# Patient Record
(demographics unavailable — no encounter records)

---

## 2025-01-15 NOTE — HISTORY OF PRESENT ILLNESS
[de-identified] : 01/15/2025  JACE OSEGUERA 80 year y/o M presents today for right knee pain. Patient reports a constant aching pain in the right knee and sharp pain with bending. Patient reports pain with stairs.    Date of Onset: About 1 month ago Mechanism of injury: NKI. Patient reports he believes he tripped about 1 month ago. Patient is unsure if his pain started due to the this fall.  Pain:    At Rest: 0/10    With Activity: 6/10   Have you been treated for this in the past? No OTC/Rx Medication: Ibuprofen 800mg PRN Heat, Ice, Elevation: No Previous Imaging/Studies: No

## 2025-01-15 NOTE — DISCUSSION/SUMMARY
[de-identified] :  JACE OSEGUERA 80 year M was seen and evaluated in office today. Following evaluation, the natural history of the pathology was explained in full to the patient in layman's terms.   Several different treatment options were discussed and explained in full to the patient, along with specific risks and benefits of both surgical and non-surgical treatments. Nonsurgical options including but not limited to Corticosteroid Injection, Visco supplementation, Prescription anti-inflammatory medications (both steroidal and non-steroidal), activity modification, non-impact exercise, maintaining a healthy BMI, bracing, and icing were all reviewed. Surgical options including but not limited to arthroscopy, and joint replacement along with other indicated procedures were discussed.   Discussed risk of morbidity associated with proposed treatment plans. These risks include, but are not limited to, the risk associated with prescription strength medications and possible side effects of these medications which include GI hemorrhage with oral medications along with cardiac/renal issues with long term use. Risks with all pertinent surgical interventions as discussed with patient including infection, bleeding, anesthesia complications, NV injury, fracture, DVT, or heterotopic ossification.   Furthermore, discussed with JACE that they could also delay any immediate treatment options and continue to observe and self-care for the discussed problem. Discussed Home Exercise Programs as well as Rest, Ice and elevation. Patient had ample time to ask any questions about todays visit and the diagnosis, and all questions were answered. Patient understands the plan going forward.   Lastly, based on this patient's presentation at our office, which is an orthopedic specialist office, this patient inherently / intrinsically has a risk of progression of symptoms/condition, as well as development and/or exacerbation of cardiovascular disease/conditions, obesity, DVT, worsening and chronic pain, and permanent loss of function, as well as decreased abilities to complete activities of daily life.  PLAN:    Following discussion of options including MRI, CSI, PT and Meloxicam, patient would like to proceed with RT KNEE CSI due to pain and inflammation.  Patient was given a CSI to the RIGHT KNEE via the superomedial portal advised to ice if sore and will observe over the next 24 hours for any redness, swelling or increased discomfort. The indication for this procedure was pain and inflammation. The site was prepped with betadine and sterile technique used. An injection of Lidocaine 5cc of 1% was used Betamethasone (Celestone) 1cc of 6mg.  The patient tolerated procedure well. They were advised to call if redness, pain or fever occur and apply ice for 15 minutes out of every hour for the next 12-24 hours as tolerated. The risks benefits, and alternatives have been discussed. These risks include infection, hemarthrosis, allergic reaction, bleeding and hyperglycemia. Verbal understanding and consent was obtained. There is a moderate risk of morbidity with further treatment, especially from use of prescription strength medication.   The patient f/u in 1 month.   Entered by Allen Gonzalez acting as scribe. Dr. Sunshine Attestation The documentation recorded by the scribe, in my presence, accurately reflects the service I, Dr. Sunshine, personally performed, and the decisions made by me with my edits as appropriate.

## 2025-01-15 NOTE — PHYSICAL EXAM
[de-identified] : Constitutional: The patient appears well developed, well nourished. Examination of patients ability to communicate functionally was normal.       Neurologic: Coordination is normal. Alert and oriented to time, place and person. No evidence of mood disorder, calm affect.            KNEE  : Inspection of the knee is as follows: moderate effusion. no ecchymosis, no streaking, no erythema, no atrophy, no deformities of the quad tendon and no deformities of patellar tendon.       Palpation of the knee is as follows: medial joint line tenderness, lateral joint line tenderness, medial facet of patella tenderness, lateral facet of patella tenderness and crepitus. no palpable masses and no increased warmth.       Knee Range of Motion is as follows in degrees:        Extension: 0    Flexion: 125        Strength examination of the knee is as follows:    Quadriceps strength is 5/5    Hamstring strength is 5/5       Ligament Stability and Special Test ligamentously stable, negative anterior draw, negative Lachman test, negative posterior draw and no varus or valgus instability.    negative McMurrays test and able to do active straight leg raise without an extensor lag.       Neurological examination of the knee is as follows: light touch is intact throughout.       Gait and function is as follows: mildly antalgic gait. 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 Medial/lateral joint space narrowing with minimal osteophyte formation.

## 2025-02-12 NOTE — HISTORY OF PRESENT ILLNESS
[de-identified] : 02/12/2025  JACE OSEGUERA 80 year y/o M presents today for follow up for right knee pain Treatments since last visit: CSI R knee on 01/15/25 with relief for a few days, Ibuprofen 800mg PRN with relief Changes Since Last Visit: Patient reports his right knee pain is about the same since his last visit.   01/15/2025  JACE OSEGUERA 80 year y/o M presents today for right knee pain. Patient reports a constant aching pain in the right knee and sharp pain with bending. Patient reports pain with stairs.    Date of Onset: About 1 month ago Mechanism of injury: NKI. Patient reports he believes he tripped about 1 month ago. Patient is unsure if his pain started due to the this fall.  Pain:    At Rest: 0/10    With Activity: 6/10   Have you been treated for this in the past? No OTC/Rx Medication: Ibuprofen 800mg PRN Heat, Ice, Elevation: No Previous Imaging/Studies: No

## 2025-02-12 NOTE — DISCUSSION/SUMMARY
[de-identified] :  JACE OSEGUERA 80 year M was seen and evaluated in office today. Following evaluation, the natural history of the pathology was explained in full to the patient in layman's terms.   Several different treatment options were discussed and explained in full to the patient, along with specific risks and benefits of both surgical and non-surgical treatments. Nonsurgical options including but not limited to Corticosteroid Injection, Visco supplementation, Prescription anti-inflammatory medications (both steroidal and non-steroidal), activity modification, non-impact exercise, maintaining a healthy BMI, bracing, and icing were all reviewed. Surgical options including but not limited to arthroscopy, and joint replacement along with other indicated procedures were discussed.   Discussed risk of morbidity associated with proposed treatment plans. These risks include, but are not limited to, the risk associated with prescription strength medications and possible side effects of these medications which include GI hemorrhage with oral medications along with cardiac/renal issues with long term use. Risks with all pertinent surgical interventions as discussed with patient including infection, bleeding, anesthesia complications, NV injury, fracture, DVT, or heterotopic ossification.   Furthermore, discussed with JACE that they could also delay any immediate treatment options and continue to observe and self-care for the discussed problem. Discussed Home Exercise Programs as well as Rest, Ice and elevation. Patient had ample time to ask any questions about todays visit and the diagnosis, and all questions were answered. Patient understands the plan going forward.   Lastly, based on this patient's presentation at our office, which is an orthopedic specialist office, this patient inherently / intrinsically has a risk of progression of symptoms/condition, as well as development and/or exacerbation of cardiovascular disease/conditions, obesity, DVT, worsening and chronic pain, and permanent loss of function, as well as decreased abilities to complete activities of daily life.  PLAN:   Patient was given Rx for MRI of the right knee to further evaluate for any ligamentous, muscle and cartilaginous injury that is suspected due to physical examination and patients description of pain, clicking, and feelings of instability and/or weakness.   Patient was instructed to f/u after imaging for review.

## 2025-02-12 NOTE — PHYSICAL EXAM
[de-identified] : Constitutional: The patient appears well developed, well nourished. Examination of patients ability to communicate functionally was normal.       Neurologic: Coordination is normal. Alert and oriented to time, place and person. No evidence of mood disorder, calm affect.            KNEE  : Inspection of the knee is as follows: moderate effusion. no ecchymosis, no streaking, no erythema, no atrophy, no deformities of the quad tendon and no deformities of patellar tendon.       Palpation of the knee is as follows: medial joint line tenderness, lateral joint line tenderness, medial facet of patella tenderness, lateral facet of patella tenderness and crepitus. no palpable masses and no increased warmth.       Knee Range of Motion is as follows in degrees:        Extension: 0    Flexion: 125        Strength examination of the knee is as follows:    Quadriceps strength is 5/5    Hamstring strength is 5/5       Ligament Stability and Special Test ligamentously stable, negative anterior draw, negative Lachman test, negative posterior draw and no varus or valgus instability.    negative McMurrays test and able to do active straight leg raise without an extensor lag.       Neurological examination of the knee is as follows: light touch is intact throughout.       Gait and function is as follows: mildly antalgic gait.

## 2025-02-26 NOTE — DATA REVIEWED
[MRI] : MRI [Right] : of the right [Knee] : knee [I independently reviewed and interpreted images and report] : I independently reviewed and interpreted images and report [FreeTextEntry1] : Horizontal tear involving the body and posterior horn medial meniscus.   Horizontal tear involving the body lateral meniscus.   Mild distal quadriceps tendinosis with a small focus of calcification at its insertion. Patellar tendinosis, without tears.

## 2025-02-26 NOTE — PHYSICAL EXAM
[de-identified] : Constitutional: The patient appears well developed, well nourished. Examination of patients ability to communicate functionally was normal.       Neurologic: Coordination is normal. Alert and oriented to time, place and person. No evidence of mood disorder, calm affect.            KNEE  : Inspection of the knee is as follows: moderate effusion. no ecchymosis, no streaking, no erythema, no atrophy, no deformities of the quad tendon and no deformities of patellar tendon.       Palpation of the knee is as follows: medial joint line tenderness, lateral joint line tenderness, medial facet of patella tenderness, lateral facet of patella tenderness and crepitus. no palpable masses and no increased warmth.       Knee Range of Motion is as follows in degrees:        Extension: 0    Flexion: 125        Strength examination of the knee is as follows:    Quadriceps strength is 5/5    Hamstring strength is 5/5       Ligament Stability and Special Test ligamentously stable, negative anterior draw, negative Lachman test, negative posterior draw and no varus or valgus instability.    negative McMurrays test and able to do active straight leg raise without an extensor lag.       Neurological examination of the knee is as follows: light touch is intact throughout.       Gait and function is as follows: mildly antalgic gait.

## 2025-02-26 NOTE — HISTORY OF PRESENT ILLNESS
[de-identified] : 02/26/2025  JACE OSEGUERA 80 year y/o M presents today for follow up for right knee pain.  Treatments since last visit: MRI R knee at , Ibuprofen PRN with relief Changes Since Last Visit: Patient reports his knee pain is the same since last visit  MRI impression:  Horizontal tear involving the body and posterior horn medial meniscus. Horizontal tear involving the body lateral meniscus. Mild distal quadriceps tendinosis with a small focus of calcification at its insertion. Patellar tendinosis, without tears.   02/12/2025  JACE OSEGUERA 80 year y/o BRODY presents today for follow up for right knee pain Treatments since last visit: CSI R knee on 01/15/25 with relief for a few days, Ibuprofen 800mg PRN with relief Changes Since Last Visit: Patient reports his right knee pain is about the same since his last visit.   01/15/2025  JACE OSEGUERA 80 year y/o BRODY presents today for right knee pain. Patient reports a constant aching pain in the right knee and sharp pain with bending. Patient reports pain with stairs.    Date of Onset: About 1 month ago Mechanism of injury: NKI. Patient reports he believes he tripped about 1 month ago. Patient is unsure if his pain started due to the this fall.  Pain:    At Rest: 0/10    With Activity: 6/10   Have you been treated for this in the past? No OTC/Rx Medication: Ibuprofen 800mg PRN Heat, Ice, Elevation: No Previous Imaging/Studies: No

## 2025-02-26 NOTE — DISCUSSION/SUMMARY
[de-identified] : JACE OSEGUERA 80 year  M was seen and evaluated in office today. Following evaluation, the natural history of the pathology was explained in full to the patient in layman's terms. At this time, they explained to the patient that based on physical exam and imaging review, patient likely has Medial/lateral meniscal tear of the knee. Discussed with patients that due to this meniscal tear, they are at increased risk of developing knee osteoarthritis. Discussed further with patient that due to the fragile blood supply of the meniscus and the nature of the condition, it is unlikely that the tear will heal on its own, and they will likely experience constant/episodic pain in the knee limiting ADLs. Discussed that due to the meniscal tear and the associated increased risk of progression to knee osteoarthritis ultimately leading to an eventual need for knee arthroplasty in the future.    In the interim, discussed with patient several different treatment options, along with specific risks and benefits of both surgical and non-surgical treatments. Nonsurgical options including but not limited to Corticosteroid Injection, Visco supplementation series, Meloxicam 15mg, Medrol Dose Pack, along with activity modification such as non-impact exercise. Risk of morbidity associated with proposed treatments were discussed. Risks include, but are not limited to, the risk associated with prescription strength medications and possible side effects of these medications which include GI hemorrhage with oral medications along with cardiac/renal issues with long term use  Furthermore, discussed with JACE that they could also delay any immediate treatment options and continue to observe and self-care for the discussed problem. Discussed Home Exercise Programs as well as Rest, Ice and elevation. Patient had ample time to ask any questions about todays visit and the diagnosis, and all questions were answered. Patient understands the plan going forward.    Based on this patient's presentation at our office, which is an orthopedic specialist office, this patients condition is expected to progress. Patient, due to this condition has the risk of development and/or exacerbation of cardiovascular disease/conditions, obesity, DVT, worsening and chronic pain, and permanent loss of function, as well as decreased abilities to complete activities of daily life.   At this time, due to patient Medial and lateral meniscus tears and continued pain with limited relief from CSI, rest, NSAIDs, patient is indicated for RT KNEE ARHTROSCOPY MEIDAL AND LATERAL MENISCECTOMY  Patient would like to proceed with this option.

## 2025-05-13 NOTE — ASSESSMENT
[Levothyroxine] : The patient was instructed to take Levothyroxine on an empty stomach, separate from vitamins, and wait at least 30 minutes before eating [FreeTextEntry1] : Hypothyroid, euthyroid on replacement. Continue current dose of Levothyroxine.   Thyroid nodule: check thyroid sonogram due to history of thyroid nodule and currently having left neck pain. Will call patient with results.  Repeat labs before next visit.  RTO in 1 year with Dr. Sutherland.

## 2025-05-13 NOTE — REVIEW OF SYSTEMS
[Dry Skin] : dry skin [Fatigue] : no fatigue [Decreased Appetite] : appetite not decreased [Recent Weight Gain (___ Lbs)] : no recent weight gain [Recent Weight Loss (___ Lbs)] : no recent weight loss [Visual Field Defect] : no visual field defect [Blurred Vision] : no blurred vision [Dysphagia] : no dysphagia [Neck Pain] : no neck pain [Dysphonia] : no dysphonia [Chest Pain] : no chest pain [Palpitations] : no palpitations [Constipation] : no constipation [Diarrhea] : no diarrhea [Hair Loss] : no hair loss [Headaches] : no headaches [Tremors] : no tremors [Depression] : no depression [Anxiety] : no anxiety [FreeTextEntry2] : weight stable

## 2025-05-13 NOTE — HISTORY OF PRESENT ILLNESS
[FreeTextEntry1] : Hypothyroid for 13 years. On Synthroid .088, last dose change an increase from .075 in 2014  had biopsy of cervical lymph node, benign  C/o left neck pain once in awhile About 10 days ago had right knee orthoscopic   PMH: BPH HTN elevated cholesterol

## 2025-05-13 NOTE — PHYSICAL EXAM
[Alert] : alert [Well Nourished] : well nourished [No Acute Distress] : no acute distress [Well Developed] : well developed [Normal Sclera/Conjunctiva] : normal sclera/conjunctiva [No Proptosis] : no proptosis [No LAD] : no lymphadenopathy [Thyroid Not Enlarged] : the thyroid was not enlarged [No Thyroid Nodules] : no palpable thyroid nodules [No Respiratory Distress] : no respiratory distress [No Accessory Muscle Use] : no accessory muscle use [Normal Rate and Effort] : normal respiratory rate and effort [Clear to Auscultation] : lungs were clear to auscultation bilaterally [Normal S1, S2] : normal S1 and S2 [Normal Rate] : heart rate was normal [Regular Rhythm] : with a regular rhythm [No Stigmata of Cushings Syndrome] : no stigmata of Cushings Syndrome [No Rash] : no rash [No Tremors] : no tremors [Oriented x3] : oriented to person, place, and time [Normal Affect] : the affect was normal [Normal Insight/Judgement] : insight and judgment were intact [Normal Mood] : the mood was normal [Acanthosis Nigricans] : no acanthosis nigricans

## 2025-05-14 NOTE — PHYSICAL EXAM
[de-identified] : Constitutional: The patient appears well developed, well nourished.       Neurologic: Coordination is normal. Alert and oriented to time, place and person. No evidence of mood disorder, calm affect.       RIGHT      KNEE: Inspection of the knee is as follows: moderate effusion and small ecchymosis. no streaking, no erythema, no atrophy, no deformities of the quad tendon and no deformities of patellar tendon.       Inspection of the wound reveals clean and dry incision, no fluctuance, no sign of infection, no erythema and no drainage. Mesh/Sutures were removed.      Palpation of the knee is as follows: no increased warmth.      Knee Range of Motion is as follows in degrees:        Extension: 3   Flexion: 90       Strength examination of the knee is as follows:    Quadriceps strength is 5/5    Hamstring strength is 5/5       Ligament Stability and Special Test ligamentously stable and no varus or valgus instability. patella tracks well and able to do active straight leg raise without an extensor lag.       Neurological examination of the knee is as follows: light touch is intact throughout.       Gait and function is as follows: mildly antalgic gait.

## 2025-05-14 NOTE — HISTORY OF PRESENT ILLNESS
[de-identified] : 05/14/2025  JACE OSEGUERA 80 year y/o BRODY presents today for follow up on right knee. Patient is s/p Right knee arthroscopy with medial and lateral meniscectomy on 5/2/2025.  Treatments since last visit: Patient reports he took ibuprofen following surgery however, denies taking medication for pain at this time.  Changes Since Last Visit: Patient reports he is progressing well. Patient denies fevers, chills, SOB.  02/26/2025  JACE OSEGUERA 80 year y/o BRODY presents today for follow up for right knee pain.  Treatments since last visit: MRI R knee at Z, Ibuprofen PRN with relief Changes Since Last Visit: Patient reports his knee pain is the same since last visit  MRI impression:  Horizontal tear involving the body and posterior horn medial meniscus. Horizontal tear involving the body lateral meniscus. Mild distal quadriceps tendinosis with a small focus of calcification at its insertion. Patellar tendinosis, without tears.   02/12/2025  JACE OSEGUERA 80 year y/o BRODY presents today for follow up for right knee pain Treatments since last visit: CSI R knee on 01/15/25 with relief for a few days, Ibuprofen 800mg PRN with relief Changes Since Last Visit: Patient reports his right knee pain is about the same since his last visit.   01/15/2025  JACE OSEGUERA 80 year y/o BRODY presents today for right knee pain. Patient reports a constant aching pain in the right knee and sharp pain with bending. Patient reports pain with stairs.    Date of Onset: About 1 month ago Mechanism of injury: NKI. Patient reports he believes he tripped about 1 month ago. Patient is unsure if his pain started due to the this fall.  Pain:    At Rest: 0/10    With Activity: 6/10   Have you been treated for this in the past? No OTC/Rx Medication: Ibuprofen 800mg PRN Heat, Ice, Elevation: No Previous Imaging/Studies: No

## 2025-05-14 NOTE — DISCUSSION/SUMMARY
[de-identified] : At this time the patient is progressing well. Patient demonstrates improvements in both range of motion and strength. Took time to discuss with patient the importance of maintaining ROM and Strength through daily HEP. Patient expressed understanding of this and will continue HEP.   Patient will go to PT no restrictions. Discussed importance of patient continuing the exercises on their own as well.   Patient was instructed to take antibiotic prophylaxis prior to any dental or GI procedures. The patient understands this precaution is to be followed for life due to the potential risk of infection.    Patient will f/u in 6 weeks.    Entered by Linda Matos acting as scribe. Dr. Sunshine Attestation The documentation recorded by the scribe, in my presence, accurately reflects the service I, Dr. Sunshine, personally performed, and the decisions made by me with my edits as appropriate. Detail Level: Detailed Depth Of Biopsy: dermis Was A Bandage Applied: Yes Size Of Lesion In Cm: 0.5 X Size Of Lesion In Cm: 0 Anticipated Plan (Based On Presumed Biopsy Results): Elecrodessication & currettage Biopsy Type: H and E Biopsy Method: Dermablade Anesthesia Type: 0.5% lidocaine with 1:200,000 epinephrine and a 1:10 solution of 8.4% sodium bicarbonate Anesthesia Volume In Cc (Will Not Render If 0): 2 Hemostasis: Drysol Wound Care: Petrolatum Dressing: bandage Destruction After The Procedure: No Type Of Destruction Used: Curettage Curettage Text: The wound bed was treated with curettage after the biopsy was performed. Cryotherapy Text: The wound bed was treated with cryotherapy after the biopsy was performed. Electrodesiccation Text: The wound bed was treated with electrodesiccation after the biopsy was performed. Electrodesiccation And Curettage Text: The wound bed was treated with electrodesiccation and curettage after the biopsy was performed. Silver Nitrate Text: The wound bed was treated with silver nitrate after the biopsy was performed. Lab: [id number] Lab Facility: [id number] Consent: Written consent was obtained and risks were reviewed including but not limited to scarring, infection, bleeding, scabbing, incomplete removal, nerve damage and allergy to anesthesia. Post-Care Instructions: I reviewed with the patient in detail post-care instructions. Patient is to keep the biopsy site dry overnight, and then apply Vaseline or Aquaphor daily until healed. Keep wound moist to avoid dry scab. OK to cover with Bandaid. Wash gently with soap and water daily. Notification Instructions: Patient will be notified of biopsy results. However, patient instructed to call the office if not contacted within 2 weeks. Billing Type: Third-Party Bill Information: Selecting Yes will display possible errors in your note based on the variables you have selected. This validation is only offered as a suggestion for you. PLEASE NOTE THAT THE VALIDATION TEXT WILL BE REMOVED WHEN YOU FINALIZE YOUR NOTE. IF YOU WANT TO FAX A PRELIMINARY NOTE YOU WILL NEED TO TOGGLE THIS TO 'NO' IF YOU DO NOT WANT IT IN YOUR FAXED NOTE.

## 2025-06-25 NOTE — HISTORY OF PRESENT ILLNESS
[de-identified] : 06/25/2025  JACE OSEGUERA 81 year y/o M presents today for follow up on right knee. Patient is s/p Right knee arthroscopy with medial and lateral meniscectomy on 5/2/2025.  Treatments since last visit: PT 2 x a week.  Changes Since Last Visit: Patient reports knee is not improving. Still has pain, swelling and decreased ROM.  he states he was taking ibuprofen 800 QD and working with PT but still notes stiffness and pain at the knee.   05/14/2025  JACE OSEGUERA 80 year y/o M presents today for follow up on right knee. Patient is s/p Right knee arthroscopy with medial and lateral meniscectomy on 5/2/2025.  Treatments since last visit: Patient reports he took ibuprofen following surgery however, denies taking medication for pain at this time.  Changes Since Last Visit: Patient reports he is progressing well. Patient denies fevers, chills, SOB.  02/26/2025  JACE OSEGUERA 80 year y/o BRODY presents today for follow up for right knee pain.  Treatments since last visit: MRI R knee at , Ibuprofen PRN with relief Changes Since Last Visit: Patient reports his knee pain is the same since last visit  MRI impression:  Horizontal tear involving the body and posterior horn medial meniscus. Horizontal tear involving the body lateral meniscus. Mild distal quadriceps tendinosis with a small focus of calcification at its insertion. Patellar tendinosis, without tears.   02/12/2025  JACE OSEGUERA 80 year y/o M presents today for follow up for right knee pain Treatments since last visit: CSI R knee on 01/15/25 with relief for a few days, Ibuprofen 800mg PRN with relief Changes Since Last Visit: Patient reports his right knee pain is about the same since his last visit.   01/15/2025  JACE OSEGUERA 80 year y/o M presents today for right knee pain. Patient reports a constant aching pain in the right knee and sharp pain with bending. Patient reports pain with stairs.    Date of Onset: About 1 month ago Mechanism of injury: NKI. Patient reports he believes he tripped about 1 month ago. Patient is unsure if his pain started due to the this fall.  Pain:    At Rest: 0/10    With Activity: 6/10   Have you been treated for this in the past? No OTC/Rx Medication: Ibuprofen 800mg PRN Heat, Ice, Elevation: No Previous Imaging/Studies: No

## 2025-06-25 NOTE — DISCUSSION/SUMMARY
[de-identified] : At this time the patient is progressing well. Patient demonstrates improvements in both range of motion and strength. Took time to discuss with patient the importance of maintaining ROM and Strength through daily HEP. Patient expressed understanding of this and will continue HEP.     At this time after discussion of the options, the patient would benefit from CONTINUED organized Physical Therapy to increase overall functionality, as they have reported feeling of improvement. The patient was provided with an updated prescription in office today and was instructed to attend PT for 6-8 weeks in order to increase strength and ROM. Patient agreed with this plan and will begin PT as soon as they can.   Patient was given a prescription for a Medrol dose pack. There is a moderate risk of morbidity with further treatment, especially from use of prescription strength medication and possible side effects of the medication. We discussed the importance of proper prescription drug management. They were instructed to take the medication as directed for 5 days and will follow up in one month. The patient was also warned of potential risks/side effects of the medication. These potential risks include allergic reaction, temporary weight gain, elevation of blood sugar, increased thirst, headache, blurred vision, dizziness and avascular necrosis. The patient expressed verbal understanding. I recommend that the patient follow-up with their medical physician to discuss any significant specific potential issues with long term use such as interactions with current medications or with exacerbation of underlying medical morbidities.   f/u 1 month  Entered by Allen Gonzalez acting as scribe. Dr. Sunshine Attestation The documentation recorded by the scribe, in my presence, accurately reflects the service I, Dr. Sunshine, personally performed, and the decisions made by me with my edits as appropriate.

## 2025-06-25 NOTE — PHYSICAL EXAM
[de-identified] : Constitutional: The patient appears well developed, well nourished.       Neurologic: Coordination is normal. Alert and oriented to time, place and person. No evidence of mood disorder, calm affect.       RIGHT      KNEE: Inspection of the knee is as follows: moderate effusion and NO  ecchymosis. no streaking, no erythema, no atrophy, no deformities of the quad tendon and no deformities of patellar tendon.       Inspection of the wound reveals: WOUNDS WELL HEALED      Palpation of the knee is as follows: no increased warmth.      Knee Range of Motion is as follows in degrees:        Extension: 3   Flexion: 105      tight hamstring Strength examination of the knee is as follows:    Quadriceps strength is 5/5    Hamstring strength is 5/5       Ligament Stability and Special Test ligamentously stable and no varus or valgus instability. patella tracks well and able to do active straight leg raise without an extensor lag.       Neurological examination of the knee is as follows: light touch is intact throughout.       Gait and function is as follows: mildly antalgic gait.

## 2025-07-16 NOTE — PHYSICAL EXAM
[de-identified] : Constitutional: The patient appears well developed, well nourished.       Neurologic: Coordination is normal. Alert and oriented to time, place and person. No evidence of mood disorder, calm affect.       RIGHT      KNEE: Inspection of the knee is as follows: moderate effusion and NO  ecchymosis. no streaking, no erythema, no atrophy, no deformities of the quad tendon and no deformities of patellar tendon.       Inspection of the wound reveals: WOUNDS WELL HEALED      Palpation of the knee is as follows: no increased warmth.      Knee Range of Motion is as follows in degrees:        Extension: 3   Flexion: 105      tight hamstring Strength examination of the knee is as follows:    Quadriceps strength is 5/5    Hamstring strength is 5/5       Ligament Stability and Special Test ligamentously stable and no varus or valgus instability. patella tracks well and able to do active straight leg raise without an extensor lag.       Neurological examination of the knee is as follows: light touch is intact throughout.       Gait and function is as follows: mildly antalgic gait.

## 2025-07-16 NOTE — HISTORY OF PRESENT ILLNESS
[de-identified] : 07/16/2025  JACE OSEGUERA 81 year y/o M presents today for follow up on right knee. Patient is s/p Right knee arthroscopy with medial and lateral meniscectomy on 5/2/2025.  Treatments since last visit: Meloxicam QD  Changes Since Last Visit: Patient reports knee is progressing well. Patient reports he is back to work and if feeling good.  Patient states he could not take the MDP because he has an allergy and notes fast heart rate.    06/25/2025  JACE OSEGUERA 81 year y/o BRODY presents today for follow up on right knee. Patient is s/p Right knee arthroscopy with medial and lateral meniscectomy on 5/2/2025.  Treatments since last visit: PT 2 x a week.  Changes Since Last Visit: Patient reports knee is not improving. Still has pain, swelling and decreased ROM.  he states he was taking ibuprofen 800 QD and working with PT but still notes stiffness and pain at the knee.   05/14/2025  JACE OSEGUERA 80 year y/o RBODY presents today for follow up on right knee. Patient is s/p Right knee arthroscopy with medial and lateral meniscectomy on 5/2/2025.  Treatments since last visit: Patient reports he took ibuprofen following surgery however, denies taking medication for pain at this time.  Changes Since Last Visit: Patient reports he is progressing well. Patient denies fevers, chills, SOB.  02/26/2025  JACE OSEGUERA 80 year y/o BRODY presents today for follow up for right knee pain.  Treatments since last visit: MRI R knee at [facility], Ibuprofen PRN with relief Changes Since Last Visit: Patient reports his knee pain is the same since last visit  MRI impression:  Horizontal tear involving the body and posterior horn medial meniscus. Horizontal tear involving the body lateral meniscus. Mild distal quadriceps tendinosis with a small focus of calcification at its insertion. Patellar tendinosis, without tears.   02/12/2025  JACE OSEGUERA 80 year y/o BRODY presents today for follow up for right knee pain Treatments since last visit: CSI R knee on 01/15/25 with relief for a few days, Ibuprofen 800mg PRN with relief Changes Since Last Visit: Patient reports his right knee pain is about the same since his last visit.   01/15/2025  JACE OSEGUERA 80 year y/o M presents today for right knee pain. Patient reports a constant aching pain in the right knee and sharp pain with bending. Patient reports pain with stairs.    Date of Onset: About 1 month ago Mechanism of injury: NKI. Patient reports he believes he tripped about 1 month ago. Patient is unsure if his pain started due to the this fall.  Pain:    At Rest: 0/10    With Activity: 6/10   Have you been treated for this in the past? No OTC/Rx Medication: Ibuprofen 800mg PRN Heat, Ice, Elevation: No Previous Imaging/Studies: No

## 2025-07-16 NOTE — DISCUSSION/SUMMARY
[de-identified] : At this time the patient is progressing well. Patient demonstrates improvements in both range of motion and strength. Took time to discuss with patient the importance of maintaining ROM and Strength through daily HEP. Patient expressed understanding of this and will continue HEP.     At this time after discussion of the options, the patient would benefit from CONTINUED organized Physical Therapy to increase overall functionality, as they have reported feeling of improvement. The patient was provided with an updated prescription in office today and was instructed to attend PT for 6-8 weeks in order to increase strength and ROM. Patient agreed with this plan and will begin PT as soon as they can.   Patient was previously given MDP, reports that he did not take because he realized he has had adverse reaction to Prednisone in the past.   At this time, indicated patient for Meloxicam 15mg due to pain and inflammation. -Patient was provided prescription for Meloxicam 15mg to be taken once daily as needed. There is a moderate risk of morbidity from use of prescription strength medications. Possible side effects of these medications include upset stomach with oral medications and cardiac/renal issues with long term use. I recommended that the patient follow-up with their medical physician to discuss any significant specific potential issues with long term medication use such as interactions with current medications or with exacerbation of underlying medical comorbidities. The patient voiced their understanding of the risk.  Patient was given a refill of the Mobic which he tolerated well.  He will f/u in 1 mos or PRN